# Patient Record
Sex: FEMALE | Race: WHITE
[De-identification: names, ages, dates, MRNs, and addresses within clinical notes are randomized per-mention and may not be internally consistent; named-entity substitution may affect disease eponyms.]

---

## 2018-09-05 ENCOUNTER — HOSPITAL ENCOUNTER (EMERGENCY)
Dept: HOSPITAL 89 - ER | Age: 23
Discharge: HOME | End: 2018-09-05
Payer: COMMERCIAL

## 2018-09-05 VITALS — SYSTOLIC BLOOD PRESSURE: 101 MMHG | DIASTOLIC BLOOD PRESSURE: 84 MMHG

## 2018-09-05 DIAGNOSIS — N83.291: ICD-10-CM

## 2018-09-05 DIAGNOSIS — T83.9XXA: Primary | ICD-10-CM

## 2018-09-05 PROCEDURE — 99284 EMERGENCY DEPT VISIT MOD MDM: CPT

## 2018-09-05 PROCEDURE — 81025 URINE PREGNANCY TEST: CPT

## 2018-09-05 PROCEDURE — 76830 TRANSVAGINAL US NON-OB: CPT

## 2018-09-05 PROCEDURE — 81001 URINALYSIS AUTO W/SCOPE: CPT

## 2018-09-05 PROCEDURE — 87591 N.GONORRHOEAE DNA AMP PROB: CPT

## 2018-09-05 PROCEDURE — 87491 CHLMYD TRACH DNA AMP PROBE: CPT

## 2018-09-05 NOTE — RADIOLOGY IMAGING REPORT
FACILITY: [facility] [place] 

 

PATIENT NAME: Rocío Cardoso

: 1995

MR: [id number]

V: [id number]

EXAM DATE: 

ORDERING PHYSICIAN: GERALDO HUMMEL

TECHNOLOGIST: 

 

Location: [facility] [place]

Patient: Rocío Cardoso

: 1995

MRN: [id number]

Visit/Account:[id number]

Date of Sevice:  2018

 

ACCESSION #: [id number]

 

Pelvic ultrasound

 

HISTORY: Pelvic pain. Unable to find IUD string.

 

COMPARISON: None available.

 

Findings:

 

Standard endovaginal  pelvic ultrasound with color flow and spectral analysis.

 

3-D imaging was performed by the technologist according to protocols developed by the radiologists an
d the facility radiology staff.  Representative images are stored on PACS.

 

Uterus:

Uterus measurement: 6.3 x 3.1 x 2.9 cm

Endometrium measurement: 4 mm

The IUD may be slightly low in the uterus. Possible small polyp or blood clot adjacent to the inferio
r aspect of the IUD. Small nabothian cysts.

 

Adnexa:

Right ovary: 2.7 x 2.6 x 2.4 cm

Left ovary: 2.7 x 2.1 x 1.6 cm

2.2 x 2.1 x 1.9 cm simple cyst in the right ovary. No suspicious mass. Normal blood flow.

 

Free fluid: Mild free fluid.

 

Urinary bladder: Empty

 

IMPRESSION:

 

1. The IUD may be slightly low in the uterus. Possible small polyp or blood clot adjacent to the infe
rior aspect of the IUD.

 

2. Mild free fluid, possibly physiologic.

 

3. Small simple cyst in the right ovary.

 

Report Dictated By: Félix Graves MD at 2018 2:15 PM

 

Report E-Signed By: Félix Graves MD  at 2018 2:20 PM

 

WSN:[id number]

## 2018-09-05 NOTE — ER REPORT
History and Physical


Time Seen By MD:  12:18


 (GERALDO HUMMEL MD)


HPI/ROS


CHIEF COMPLAINT: "Uterine pain"





HISTORY OF PRESENT ILLNESS: Patient is a 23-year-old female presents with 3 days


of lower pelvic and abdominal pain that is crampy in nature. Patient recently 


had an IUD placed in May 2018. She reports over the last 3 days pelvic cramping 


and pain without vaginal bleeding or discharge. She denies any prior 


pregnancies. She no longer gets her menstrual cycles secondary to her birth 


control. She was instructed by her primary care provider to come to the 


emergency department for evaluation of the location of her IUD. She denies any 


fevers or chills she denies nausea vomiting or diarrhea.





REVIEW OF SYSTEMS:


Respiratory: No cough, no dyspnea.


Cardiovascular: No chest pain, no palpitations.


Gastrointestinal: Lower abdominal pain


Musculoskeletal: No back pain.


 (GERALDO HUMMEL MD)


Allergies:  


Coded Allergies:  


     No Known Drug Allergies (Unverified , 9/5/18)


Home Meds


No Active Prescriptions or Reported Meds


Hx Substance Use Disorder:  No


Hx Alcohol Use:  No


 (GERALDO HUMMEL MD)


Constitutional





Vital Sign - Last 24 Hours








 9/5/18 9/5/18 9/5/18 9/5/18





 12:14 12:23 12:29 12:30


 


Temp   98.5 


 


Pulse 78  69 


 


Resp   12 


 


B/P (MAP)  125/89 (101) 125/89 111/78 (89)


 


Pulse Ox 99  97 


 


O2 Delivery Room Air  Room Air 


 


    





 9/5/18 9/5/18 9/5/18 9/5/18





 12:44 13:00 14:00 14:06


 


Pulse 76   74


 


B/P (MAP)  106/67 (80) 110/74 (86) 


 


Pulse Ox 98   99


 


O2 Delivery Room Air   Room Air





 (LAURORA,STAN V DO)


Physical Exam


  General Appearance: The patient is alert, has no immediate need for airway 


protection and no current signs of toxicity.  


Respiratory: Chest is non tender, lungs are clear to auscultation.


Cardiac: regular rate and rhythm


Gastrointestinal: Abdomen is soft and non tender, no masses, bowel sounds 


normal.


Musculoskeletal: Extremities have full range of motion and are non tender.


Pelvic exam: Patient Sirs pollicis standby during a female exam. Speculum exam 


revealed normal introitus area no evidence of vaginal or vulvar lesions. Patient


did have some yellow mucopurulent vaginal discharge noted the cervix appeared 


normal. Cervical os was identified no IUD string could be visualized or 


palpated. The patient had no adnexal or cervical motion tenderness. GC and 


Chlamydia cultures were obtained and sent.


 (GERALDO HUMMEL MD)





Medical Decision Making


Data Points


Laboratory





Hematology








Test


 9/5/18


12:21 9/5/18


13:02


 


Urine Color Yellow  


 


Urine Clarity Clear  


 


Urine pH


 5.0 pH


(4.8-9.5) 





 


Urine Specific Gravity 1.019  


 


Urine Protein


 Negative mg/dL


(NEGATIVE) 





 


Urine Glucose (UA)


 Negative mg/dL


(NEGATIVE) 





 


Urine Ketones


 Negative mg/dL


(NEGATIVE) 





 


Urine Blood


 Negative


(NEGATIVE) 





 


Urine Nitrite


 Negative


(NEGATIVE) 





 


Urine Bilirubin


 Negative


(NEGATIVE) 





 


Urine Urobilinogen


 Negative mg/dL


(0.2-1.9) 





 


Urine Leukocyte Esterase


 Negative


(NEGATIVE) 





 


Urine RBC


 <1 /HPF


(0-2/HPF) 





 


Urine WBC


 <1 /HPF


(0-5/HPF) 





 


Urine Squamous Epithelial


Cells Many /LPF


(</=FEW) 





 


Urine Bacteria


 Few /HPF


(NONE-FEW) 





 


Urine Mucus


 Few /HPF


(NONE-FEW) 





 


Urine HCG, Qualitative


 Negative


(NEGATIVE) 











Chemistry








Test


 9/5/18


12:21 9/5/18


13:02


 


Urine Color Yellow  


 


Urine Clarity Clear  


 


Urine pH


 5.0 pH


(4.8-9.5) 





 


Urine Specific Gravity 1.019  


 


Urine Protein


 Negative mg/dL


(NEGATIVE) 





 


Urine Glucose (UA)


 Negative mg/dL


(NEGATIVE) 





 


Urine Ketones


 Negative mg/dL


(NEGATIVE) 





 


Urine Blood


 Negative


(NEGATIVE) 





 


Urine Nitrite


 Negative


(NEGATIVE) 





 


Urine Bilirubin


 Negative


(NEGATIVE) 





 


Urine Urobilinogen


 Negative mg/dL


(0.2-1.9) 





 


Urine Leukocyte Esterase


 Negative


(NEGATIVE) 





 


Urine RBC


 <1 /HPF


(0-2/HPF) 





 


Urine WBC


 <1 /HPF


(0-5/HPF) 





 


Urine Squamous Epithelial


Cells Many /LPF


(</=FEW) 





 


Urine Bacteria


 Few /HPF


(NONE-FEW) 





 


Urine Mucus


 Few /HPF


(NONE-FEW) 





 


Urine HCG, Qualitative


 Negative


(NEGATIVE) 











Urinalysis








Test


 9/5/18


12:21


 


Urine Color Yellow 


 


Urine Clarity Clear 


 


Urine pH


 5.0 pH


(4.8-9.5)


 


Urine Specific Gravity 1.019 


 


Urine Protein


 Negative mg/dL


(NEGATIVE)


 


Urine Glucose (UA)


 Negative mg/dL


(NEGATIVE)


 


Urine Ketones


 Negative mg/dL


(NEGATIVE)


 


Urine Blood


 Negative


(NEGATIVE)


 


Urine Nitrite


 Negative


(NEGATIVE)


 


Urine Bilirubin


 Negative


(NEGATIVE)


 


Urine Urobilinogen


 Negative mg/dL


(0.2-1.9)


 


Urine Leukocyte Esterase


 Negative


(NEGATIVE)


 


Urine RBC


 <1 /HPF


(0-2/HPF)


 


Urine WBC


 <1 /HPF


(0-5/HPF)


 


Urine Squamous Epithelial


Cells Many /LPF


(</=FEW)


 


Urine Bacteria


 Few /HPF


(NONE-FEW)


 


Urine Mucus


 Few /HPF


(NONE-FEW)


 


Urine HCG, Qualitative


 Negative


(NEGATIVE)





 (STAN TY DO)





ED Course/Re-evaluation


ED Course


 09/05/2018 12:46:05 pm pregnancy test is negative we will set up for pelvic 


exam. If we're able to verify position of the IUD patient would prefer to leave 


it in place. We will certainly get an ultrasound of the pelvis to look for 


additional causes of pain. If the IUD string is not located we will do imaging 


studies to find its location.


Decision to Disposition Date:  Sep 5, 2018


Decision to Disposition Time:  18:00


 (GERALDO HUMMEL MD)


ED Course


 09/05/2018 2:40:44 pm  Signed out to me pending US.  Pts US shows a simple cyst


as well as an IUD may be slighlty low in jerilyn uterus.  Spoke with Dr. Dixon, 


OB/GYN, and he feels he may be able to remove the IUD in his office dispite 


string not visible. He wants to see her before 9am tomorrow will call the office


to schedule. 





 09/05/2018 2:52:17 pm Office states that they will put her in at 8am tomorrow 


am.  Pt states she is able to go at that time since class is at 930


Decision to Disposition Date:  Sep 5, 2018


Decision to Disposition Time:  14:52


 (STAN TY DO)





Depart


Departure


Latest Vital Signs





Vital Signs








  Date Time  Temp Pulse Resp B/P (MAP) Pulse Ox O2 Delivery O2 Flow Rate FiO2


 


9/5/18 14:06  74   99 Room Air  


 


9/5/18 14:00    110/74 (86)    


 


9/5/18 12:29 98.5  12     





 (STAN TY DO)


Impression:  


   Primary Impression:  


   IUD complication


   Additional Impression:  


   Simple ovarian cyst


Condition:  Condition Unchanged


Disposition:  HOME OR SELF-CARE


Referrals:  


MOOKIE DIXON MD


tomorrow at 8am


New Scripts


No Active Prescriptions or Reported Meds


Patient Instructions:  Ovarian Cyst (ED)





Additional Instructions:  


Your ultrasound today shows a simple ovarian cyst on the right a well as your 


IUD is slightly low in your uterus.


You have an appointment with Dr. Dixon tomorrow at 8am to have your IUD 


removed. 





Motrin (advil,ibuprofen) 600mg every 6 hours as needed for cramping and pain.





Problem Qualifiers








   Primary Impression:  


   IUD complication


   Device complication type:  unspecified  Encounter type:  initial encounter  


   Qualified Codes:  T83.9XXA - Unspecified complication of genitourinary 


   prosthetic device, implant and graft, initial encounter








GERALDO HUMMEL MD              Sep 5, 2018 12:18


STAN TY DO             Sep 5, 2018 14:55